# Patient Record
Sex: MALE | Race: WHITE | NOT HISPANIC OR LATINO
[De-identification: names, ages, dates, MRNs, and addresses within clinical notes are randomized per-mention and may not be internally consistent; named-entity substitution may affect disease eponyms.]

---

## 2024-07-22 ENCOUNTER — NON-APPOINTMENT (OUTPATIENT)
Age: 84
End: 2024-07-22

## 2024-08-02 ENCOUNTER — APPOINTMENT (OUTPATIENT)
Dept: NEUROSURGERY | Facility: CLINIC | Age: 84
End: 2024-08-02

## 2024-08-02 VITALS
OXYGEN SATURATION: 98 % | SYSTOLIC BLOOD PRESSURE: 100 MMHG | HEART RATE: 65 BPM | HEIGHT: 76 IN | WEIGHT: 220 LBS | BODY MASS INDEX: 26.79 KG/M2 | RESPIRATION RATE: 18 BRPM | DIASTOLIC BLOOD PRESSURE: 66 MMHG

## 2024-08-02 DIAGNOSIS — E78.5 HYPERLIPIDEMIA, UNSPECIFIED: ICD-10-CM

## 2024-08-02 DIAGNOSIS — R51.9 HEADACHE, UNSPECIFIED: ICD-10-CM

## 2024-08-02 DIAGNOSIS — C80.1 MALIGNANT (PRIMARY) NEOPLASM, UNSPECIFIED: ICD-10-CM

## 2024-08-02 DIAGNOSIS — G50.1 ATYPICAL FACIAL PAIN: ICD-10-CM

## 2024-08-02 DIAGNOSIS — I48.91 UNSPECIFIED ATRIAL FIBRILLATION: ICD-10-CM

## 2024-08-02 DIAGNOSIS — Z87.891 PERSONAL HISTORY OF NICOTINE DEPENDENCE: ICD-10-CM

## 2024-08-02 DIAGNOSIS — Z85.6 PERSONAL HISTORY OF LEUKEMIA: ICD-10-CM

## 2024-08-02 DIAGNOSIS — I10 ESSENTIAL (PRIMARY) HYPERTENSION: ICD-10-CM

## 2024-08-02 PROCEDURE — 99205 OFFICE O/P NEW HI 60 MIN: CPT

## 2024-08-02 RX ORDER — EZETIMIBE 10 MG/1
TABLET ORAL
Refills: 0 | Status: ACTIVE | COMMUNITY

## 2024-08-02 RX ORDER — FINASTERIDE 1 MG/1
TABLET ORAL
Refills: 0 | Status: ACTIVE | COMMUNITY

## 2024-08-02 RX ORDER — GABAPENTIN 100 MG/1
100 CAPSULE ORAL
Refills: 0 | Status: ACTIVE | COMMUNITY

## 2024-08-02 RX ORDER — ATENOLOL 50 MG/1
TABLET ORAL
Refills: 0 | Status: ACTIVE | COMMUNITY

## 2024-08-02 RX ORDER — ROSUVASTATIN CALCIUM 5 MG/1
TABLET, FILM COATED ORAL
Refills: 0 | Status: ACTIVE | COMMUNITY

## 2024-08-02 RX ORDER — APIXABAN 5 MG/1
TABLET, FILM COATED ORAL
Refills: 0 | Status: ACTIVE | COMMUNITY

## 2024-08-06 ENCOUNTER — APPOINTMENT (OUTPATIENT)
Dept: CT IMAGING | Facility: CLINIC | Age: 84
End: 2024-08-06

## 2024-08-06 PROCEDURE — 70450 CT HEAD/BRAIN W/O DYE: CPT

## 2024-08-07 ENCOUNTER — TRANSCRIPTION ENCOUNTER (OUTPATIENT)
Age: 84
End: 2024-08-07

## 2024-08-07 NOTE — HISTORY OF PRESENT ILLNESS
[de-identified] : 83 yo M with PMH of esophageal cancer (s/p resection, in remission since in 2017), leukemia (s/p chemo), HTN, A.fib (currently on Eliquis), HLD reports sudden onset of R side ear hearing loss/pain since 3/2024. He was diagnosed ear casimiro infection with severe obstruction which was treated with abs and subsequent ear surgery with Dr. Andrei Dinh on 6/10/24. However he noticed his R side ear pain got worse with new onset of R side headache/facial pain since the surgery. He has been trying gabapentin 100mg TID without relief. Pain is described as constant sharp/dull pain on his R side head/face worsening various activity or at night time and unable to recall alleviating factor. He denies visual changes, n/v, weakness, seizure activity or any other focal neuro deficits. MRI brain w/wo was completed by ENT which showed enhancing soft tissue thickening on right external auditory canal as well as right temporomandibular joint/periarticular soft tissue and right  space enhancing soft tissue. He was evaluated by neurology without significant pathologic diagnosis, suggested possible injection trials. He is here for neurosurgical evaluation for possible trigeminal neuropathy.

## 2024-08-07 NOTE — HISTORY OF PRESENT ILLNESS
[de-identified] : 85 yo M with PMH of esophageal cancer (s/p resection, in remission since in 2017), leukemia (s/p chemo), HTN, A.fib (currently on Eliquis), HLD reports sudden onset of R side ear hearing loss/pain since 3/2024. He was diagnosed ear casimiro infection with severe obstruction which was treated with abs and subsequent ear surgery with Dr. Andrei Dinh on 6/10/24. However he noticed his R side ear pain got worse with new onset of R side headache/facial pain since the surgery. He has been trying gabapentin 100mg TID without relief. Pain is described as constant sharp/dull pain on his R side head/face worsening various activity or at night time and unable to recall alleviating factor. He denies visual changes, n/v, weakness, seizure activity or any other focal neuro deficits. MRI brain w/wo was completed by ENT which showed enhancing soft tissue thickening on right external auditory canal as well as right temporomandibular joint/periarticular soft tissue and right  space enhancing soft tissue. He was evaluated by neurology without significant pathologic diagnosis, suggested possible injection trials. He is here for neurosurgical evaluation for possible trigeminal neuropathy.

## 2024-08-07 NOTE — ASSESSMENT
[FreeTextEntry1] : Atypical R side facial pain. His current symptoms can be related to infection/inflammation 2/2 post op. Discussed possible rhizotomy for trigeminal nerve ablation if his symptoms persist. I also discussed the case with his ENT physician in [place] who believes that the inflammatory changes seen on MRI may resolve with time.  - [facility] protocol as preop image - f/u after image to review and further surgery plan  I, Dr. Jericho Andres, personally performed the evaluation and management (E/M) services for this new patient. That E/M includes conducting the clinically appropriate initial history &/or exam, assessing all conditions, and establishing the plan of care. Today, my SEAN, Hyunchu Bryanna-Gold, was here to observe my evaluation and management service for this patient & follow plan of care established by me going forward.
[FreeTextEntry1] : Atypical R side facial pain. His current symptoms can be related to infection/inflammation 2/2 post op. Discussed possible rhizotomy for trigeminal nerve ablation if his symptoms persist. I also discussed the case with his ENT physician in [place] who believes that the inflammatory changes seen on MRI may resolve with time.  - [facility] protocol as preop image - f/u after image to review and further surgery plan  I, Dr. Jericho Andres, personally performed the evaluation and management (E/M) services for this new patient. That E/M includes conducting the clinically appropriate initial history &/or exam, assessing all conditions, and establishing the plan of care. Today, my SEAN, Hyunchu Bryanna-Gold, was here to observe my evaluation and management service for this patient & follow plan of care established by me going forward.
No

## 2024-08-26 DIAGNOSIS — M17.11 UNILATERAL PRIMARY OSTEOARTHRITIS, RIGHT KNEE: ICD-10-CM

## 2024-08-26 DIAGNOSIS — M25.561 PAIN IN RIGHT KNEE: ICD-10-CM

## 2024-08-26 DIAGNOSIS — M25.562 PAIN IN LEFT KNEE: ICD-10-CM

## 2024-08-26 DIAGNOSIS — M17.12 UNILATERAL PRIMARY OSTEOARTHRITIS, LEFT KNEE: ICD-10-CM

## 2024-09-04 ENCOUNTER — NON-APPOINTMENT (OUTPATIENT)
Age: 84
End: 2024-09-04